# Patient Record
Sex: FEMALE | Race: WHITE
[De-identification: names, ages, dates, MRNs, and addresses within clinical notes are randomized per-mention and may not be internally consistent; named-entity substitution may affect disease eponyms.]

---

## 2018-07-15 ENCOUNTER — HOSPITAL ENCOUNTER (INPATIENT)
Dept: HOSPITAL 80 - FED | Age: 63
LOS: 2 days | Discharge: HOME | DRG: 603 | End: 2018-07-17
Attending: HOSPITALIST | Admitting: HOSPITALIST
Payer: COMMERCIAL

## 2018-07-15 DIAGNOSIS — L03.116: Primary | ICD-10-CM

## 2018-07-15 DIAGNOSIS — Z72.89: ICD-10-CM

## 2018-07-15 DIAGNOSIS — B96.89: ICD-10-CM

## 2018-07-15 DIAGNOSIS — E11.9: ICD-10-CM

## 2018-07-15 DIAGNOSIS — L30.2: ICD-10-CM

## 2018-07-15 LAB
INR PPP: 0.95 (ref 0.83–1.16)
PLATELET # BLD: 178 10^3/UL (ref 150–400)
PROTHROMBIN TIME: 12.9 SEC (ref 12–15)

## 2018-07-15 RX ADMIN — Medication SCH MLS: at 22:51

## 2018-07-15 RX ADMIN — INSULIN LISPRO SCH UNITS: 100 INJECTION, SOLUTION INTRAVENOUS; SUBCUTANEOUS at 17:56

## 2018-07-15 RX ADMIN — FAMOTIDINE SCH MG: 20 TABLET, FILM COATED ORAL at 22:51

## 2018-07-15 RX ADMIN — INSULIN LISPRO SCH UNITS: 100 INJECTION, SOLUTION INTRAVENOUS; SUBCUTANEOUS at 12:57

## 2018-07-15 RX ADMIN — INSULIN LISPRO SCH: 100 INJECTION, SOLUTION INTRAVENOUS; SUBCUTANEOUS at 12:42

## 2018-07-15 RX ADMIN — INSULIN LISPRO SCH UNITS: 100 INJECTION, SOLUTION INTRAVENOUS; SUBCUTANEOUS at 22:51

## 2018-07-15 NOTE — PDGENHP
History and Physical





- Chief Complaint


leg pain, redness, rash





- History of Present Illness


61 yo female with recent diagnosis of diabetes presents to ED with rash.  She 

says she developed a rash on her arms 4 days PTA.  This then spread to her back 

and also her RLE, which was confluent.  In addition, her proximal thighs and 

groin area area are affected.  She initially had significant pruritis.  She 

tried calamine lotion and hydrogen peroxide, which she has been applying to her 

anterior right shin for several days.  She awoke this morning with severe 

itching.  Three weeks ago, she hit her right anterior shin on the corner of a 

dresser, but did not break the skin.  However, after onset of the rash, this 

area opened and began oozing yellow drainage, she denies pus.  She denies fevers

, chills or rigors.


In the ED, blood cultures were drawn and she was given IV benadryl, pepcid, and 

solumedrol for possible allergic component.  She received a dose of vancomycin 

for presumed cellulitis and is admitted to the hospital for further management.





History Information





- Allergies/Home Medication List


Allergies/Adverse Reactions: 








peanut Allergy (Verified 07/15/18 06:51)


 


soy Allergy (Verified 07/15/18 06:51)


 





Home Medications: 








NK [No Known Home Meds]  07/15/18 [Last Taken Unknown]





I have personally reviewed and updated: family history, medical history, social 

history, surgical history





- Past Medical History


diabetes type 2


Additional medical history: Recently diagnosed DM, not on meds





- Surgical History


Reports: no pertinent surgical hx





- Family History


Positive for: non-pertinent





- Social History


Smoking Status: Never smoked


Alcohol Use: Other (daily wine, 1-2 glasses)


Drug Use: None


Additional social history: Works as  for [employer].





Review of Systems


Review of Systems: 





ROS: 10pt was reviewed & negative except for what was stated in HPI & below





Physical Exam


Physical Exam: 

















Temp Pulse Resp BP Pulse Ox


 


 36.8 C   84   20   144/69 H  94 


 


 07/15/18 11:14  07/15/18 11:14  07/15/18 11:14  07/15/18 11:14  07/15/18 11:14











Constitutional: no apparent distress


Eyes: PERRL


Ears, Nose, Mouth, Throat: moist mucous membranes


Cardiovascular: regular rate and rhythym


Respiratory: no respiratory distress, clear to auscultation


Gastrointestinal: normoactive bowel sounds, soft, non-tender abdomen


Skin: warm, rash, other (RLE with confluent erythema and edema with satellite 

lesions and b/l inner thighs with more confluent erythema.  Diffuse 

maculopapular rash with excoriations on b/l extremities and trunk)


Musculoskeletal: full muscle strength


Neurologic: AAOx3


Psychiatric: interacting appropriately





Lab Data & Imaging Review





 07/15/18 07:10





 07/15/18 07:10














WBC  7.59 10^3/uL (3.80-9.50)   07/15/18  07:10    


 


RBC  5.60 10^6/uL (4.18-5.33)  H  07/15/18  07:10    


 


Hgb  17.1 g/dL (12.6-16.3)  H  07/15/18  07:10    


 


POC Hgb  18.4 gm/dL (12.6-16.3)  H  07/15/18  07:27    


 


Hct  49.4 % (38.0-47.0)  H  07/15/18  07:10    


 


POC Hct  54 % (38-47)  H  07/15/18  07:27    


 


MCV  88.2 fL (81.5-99.8)   07/15/18  07:10    


 


MCH  30.5 pg (27.9-34.1)   07/15/18  07:10    


 


MCHC  34.6 g/dL (32.4-36.7)   07/15/18  07:10    


 


RDW  12.3 % (11.5-15.2)   07/15/18  07:10    


 


Plt Count  178 10^3/uL (150-400)   07/15/18  07:10    


 


MPV  13.0 fL (8.7-11.7)  H  07/15/18  07:10    


 


Neut % (Auto)  56.1 % (39.3-74.2)   07/15/18  07:10    


 


Lymph % (Auto)  26.9 % (15.0-45.0)   07/15/18  07:10    


 


Mono % (Auto)  6.5 % (4.5-13.0)   07/15/18  07:10    


 


Eos % (Auto)  10.1 % (0.6-7.6)  H  07/15/18  07:10    


 


Baso % (Auto)  0.3 % (0.3-1.7)   07/15/18  07:10    


 


Nucleat RBC Rel Count  0.0 % (0.0-0.2)   07/15/18  07:10    


 


Absolute Neuts (auto)  4.26 10^3/uL (1.70-6.50)   07/15/18  07:10    


 


Absolute Lymphs (auto)  2.04 10^3/uL (1.00-3.00)   07/15/18  07:10    


 


Absolute Monos (auto)  0.49 10^3/uL (0.30-0.80)   07/15/18  07:10    


 


Absolute Eos (auto)  0.77 10^3/uL (0.03-0.40)  H  07/15/18  07:10    


 


Absolute Basos (auto)  0.02 10^3/uL (0.02-0.10)   07/15/18  07:10    


 


Absolute Nucleated RBC  0.00 10^3/uL (0-0.01)   07/15/18  07:10    


 


Immature Gran %  0.1 % (0.0-1.1)   07/15/18  07:10    


 


Immature Gran #  0.01 10^3/uL (0.00-0.10)   07/15/18  07:10    


 


PT  12.9 SEC (12.0-15.0)   07/15/18  07:10    


 


INR  0.95  (0.83-1.16)   07/15/18  07:10    


 


APTT  23.3 SEC (23.0-38.0)   07/15/18  07:10    


 


VBG Lactic Acid  1.7 mmol/L (0.7-2.1)   07/15/18  07:10    


 


POC Sodium  135 mEq/L (135-145)   07/15/18  07:27    


 


Sodium  133 mEq/L (135-145)  L  07/15/18  07:10    


 


POC Potassium  3.5 mEq/L (3.3-5.0)   07/15/18  07:27    


 


Potassium  3.8 mEq/L (3.3-5.0)   07/15/18  07:10    


 


POC Chloride  97 mEq/L ()   07/15/18  07:27    


 


Chloride  99 mEq/L ()   07/15/18  07:10    


 


Carbon Dioxide  22 mEq/l (22-31)   07/15/18  07:10    


 


Anion Gap  12 mEq/L (8-16)   07/15/18  07:10    


 


POC BUN  24 mg/dL (7-23)  H  07/15/18  07:27    


 


BUN  23 mg/dL (7-23)   07/15/18  07:10    


 


Creatinine  0.8 mg/dL (0.6-1.0)   07/15/18  07:10    


 


POC Creatinine  0.8 mg/dL (0.6-1.0)   07/15/18  07:27    


 


Estimated GFR  > 60   07/15/18  07:10    


 


Glucose  340 mg/dL ()  H  07/15/18  07:10    


 


POC Glucose  346 mg/dL ()  H  07/15/18  11:40    


 


Calcium  9.3 mg/dL (8.5-10.4)   07/15/18  07:10    


 


Total Bilirubin  0.6 mg/dL (0.1-1.4)   07/15/18  07:10    


 


C-Reactive Protein  28.8 mg/L (<10.0)  H  07/15/18  07:10    


 


Procalcitonin  0.14 ng/mL (0.02-0.10)  H  07/15/18  07:10    











Assessment & Plan


Assessment: 








LLE cellulitis - occurred after traumatic injury 3 weeks ago.  Wound culture 

from left anterior shin sent, f/u gram stain.  Continue Vancomycin while 

awaiting Cx data, BCx's also pending.  Trend CRP.  ID consult requested.





Diffuse rash - unclear if this is a disseminated reaction related to LE 

cellulitis (?strep) or if she is having an allergic reaction.  She did apply 

calamine and hydrogen peroxide, either of which may have caused an allergy.  

Will give q6h benadryl, bid pepcid and oral prednisone, acknowledging the 

latter may contribute to elevated blood sugars.





DM type 2 - new diagnosis, not on treatment.  Send a1c to assess overall 

control.  Start low dose Lantus plus ACHS sliding scale insulin, consider 

metformin at d/c. 





Alcohol use - reports 1-2 glasses of wine daily, monitor for signs of 

withdrawal.





Full code





DVT PPLX - Lovenox





Dispo - inpt, anticipate >48 hrs hospitalization for ongoing management of 

cellulitis and possible allergic reaction

## 2018-07-15 NOTE — ASMTCMCOM
CM Note

 

CM Note                       

Notes:

Pt lives alone and works as a , she is admitted with LLE cellulitis. No therapies 

are ordered, unclear if she will need IV abx, CM w/f.



DC Plan: TBD

 

Date Signed:  07/15/2018 04:50 PM

Electronically Signed By:Petra Gonzalez RN

## 2018-07-15 NOTE — PDMN
Medical Necessity


Medical necessity: MCG: M70 Cellulitis: 63 y/o presents with LLE cellulitis and 

diffuse rash r/t strep cellulitis vs. allergy.  IV antibx started (Vanco). 

wound Cx and BC pending.  ID consult.  Hypertensive , , hx 

diabetes.  Anticipate >2MN for ongoing management of cellulitis and possibler 

allergic reaction.

## 2018-07-15 NOTE — GCON
[f rep st]



                                                                    CONSULTATION





INPATIENT INFECTIOUS DISEASE CONSULTATION



REFERRING PHYSICIAN:  Kelly J. Cushing, MD





REASON FOR REFERRAL:  Left lower extremity cellulitis and systemic pruritic rash.



HISTORY OF PRESENT ILLNESS:  The patient is a 62-year-old female with essentially negative past medic
al history, who presented to [facility] Emergency Room with inflammation of her left lo
wer extremity as well as a new onset full body rash that developed over her arms, back, and legs.  Th
e patient relates that 3 weeks ago she hit her left shin on the corner of a chest, but it did not pierre
ak the skin, but did cause a swelling in the area.  The patient noticed that this localized area aidan
me more inflamed and began oozing a yellow clear drainage.  Erythema that was confluent developed ronal
und this area on the leg and now at presentation, she has a fully-confluent erythema from the knee di
stal on the left side.  The patient denied any fever, chills, or rigors with this.  Approximately 4 d
ays ago, she began developing a highly-pruritic macular rash over her entire trunk as well as her rig
ht lower extremity and bilateral upper extremities.  She has been using calamine lotion to decrease t
he itching.  The patient was seen in the emergency room.  Blood cultures were drawn and she was start
ed on IV vancomycin.  She was also given IV Benadryl, Pepcid, and Solu-Medrol for potential allergic 
response, although she had not taken any medications prior to this response.  Currently, she is resti
ng in her hospital bed.  Her symptoms of pruritus are improved after the medications.



PAST MEDICAL HISTORY:  Recently-diagnosed glucose intolerance, possible diabetes mellitus.  She is no
t on medications.



PAST SURGICAL HISTORY:  Negative.



ANTIBIOTICS:  Vancomycin.



ALLERGIES:  The patient is allergic to soy and peanuts.



SOCIAL HISTORY:  [place]  She has no tobacco use.  Patient does drink 1-2 
glasses of wine daily.  No drug use.  The patient works as a  for [employer].



FAMILY HISTORY:  Reviewed, but noncontributory.



REVIEW OF SYSTEMS:  Other than that detailed above in History of Present Illness, a comprehensive 10-
system review is negative.



PHYSICAL EXAMINATION:  VITAL SIGNS:  Temperature maximum is 36.8, temperature current is 36.7, heart 
rate is 99, respiratory rate is 12, blood pressure is 138/73.  GENERAL:  The patient is a well-formed
, well-nourished female in no acute distress.  She is not toxic in appearance.  She is alert and orie
nted x3.  She is pleasant in demeanor.  HEENT:  Normocephalic for age.  Atraumatic.  No scleral icter
us.  No oral lesion or drainage from the nares. EYES:  Lids and conjunctivae are within normal limits
.  Pupils are equal and round bilaterally.  NECK:  Supple.  No meningismus.  LUNGS:  Clear to auscult
ation bilaterally with good effort.  HEART:  Regular rate and rhythm.  No significant murmur, rub, or
 gallop.  No significant peripheral edema.  SKIN:  Warm and dry to the touch.  The patient does have 
a confluent erythematous rash from the knee distal to the ankle on the left lower extremity.  Patient
 also has a macular rash with irregular distribution over all 4 limbs as well as her back.  This is a
 pruritic rash. MUSCULOSKELETAL:  No other muscle belly tenderness is noted.  No joint line effusion 
or arthritis seen.  NEURO:  Cranial nerves 2-12 seem to be intact.  Peripheral sensation seems intact
 in extremities.



LABORATORY DATA:  The patient has a CBC dated 07/15/2018, shows white blood cell count of 7.6, hemogl
obin of 17.1, hematocrit of 49.4, platelet count of 178, differentials within normal limits.  Serum c
hemistries on 07/15/2018, show sodium of 135, potassium 3.5, chloride of 97, bicarbonate of 22, BUN o
f 24, creatinine 0.8.  Glucose levels have been measured in the 300s at checks today.  C-reactive pro
tein is elevated at 28.8.  Procalcitonin is 0.14.



MICROBIOLOGIC DATA:  Patient has blood cultures dated 07/15/2018, which are pending.  Leg swab dated 
07/15/2018, is also pending.



ASSESSMENT:  

1.  Left lower extremity cellulitis secondary to a minor trauma which occurred a couple of weeks ago.
  This is currently covered with monotherapy with vancomycin, although the patient has no history of 
any antibiotic use nor resistant bacterial infections nor significant healthcare exposure.  I think w
drew will change this antibiotic from vancomycin to intravenous cefazolin 2 g q.8 hours.

2.  Full body rash that is pruritic, which occurred on the heels of this infectious presentation in h
er left lower extremity.  This is most consistent with an id reaction.  We can use Benadryl and anti 
inflammatories to manage symptoms, but this should clear up as we are treating the primary infection.




PLAN:  

1.  Discontinue vancomycin.

2.  Start Ancef 2 g IV q.8 hours.

3.  Antihistamines and anti-inflammatories p.r.n. secondary to pruritic rash due to id reaction.





Job #:  [id number]

## 2018-07-16 RX ADMIN — FAMOTIDINE SCH MG: 20 TABLET, FILM COATED ORAL at 20:00

## 2018-07-16 RX ADMIN — Medication SCH MLS: at 05:21

## 2018-07-16 RX ADMIN — Medication SCH MLS: at 13:27

## 2018-07-16 RX ADMIN — Medication SCH MLS: at 22:25

## 2018-07-16 RX ADMIN — INSULIN LISPRO SCH UNITS: 100 INJECTION, SOLUTION INTRAVENOUS; SUBCUTANEOUS at 22:25

## 2018-07-16 RX ADMIN — INSULIN LISPRO SCH UNITS: 100 INJECTION, SOLUTION INTRAVENOUS; SUBCUTANEOUS at 13:27

## 2018-07-16 RX ADMIN — INSULIN LISPRO SCH UNITS: 100 INJECTION, SOLUTION INTRAVENOUS; SUBCUTANEOUS at 17:54

## 2018-07-16 RX ADMIN — FAMOTIDINE SCH MG: 20 TABLET, FILM COATED ORAL at 07:48

## 2018-07-16 RX ADMIN — INSULIN LISPRO SCH UNITS: 100 INJECTION, SOLUTION INTRAVENOUS; SUBCUTANEOUS at 07:49

## 2018-07-16 NOTE — PCMIDPN
Assessment/Plan: 


Assessment/Plan:


* Left lower extremity cellulitis:  Clinically improving with cefazolin and 

elevation.  Anticipate may be able to change to oral antibiotic therapy in next 

24-48 hours.


* Diffuse rash:  Agree may represent an id reaction/autoeczemation.  Responding 

to antihistamines and prednisone.  Continue to follow.





07/16/18 20:04





Subjective: 





Patient notes significant decrease in left lower extremity pain and redness.  

Pruritis markedly decreased.


Objective: 


 Vital Signs











Temp Pulse Resp BP Pulse Ox


 


 36.6 C   68   16   137/43 H  91 L


 


 07/16/18 19:46  07/16/18 19:46  07/16/18 19:46  07/16/18 19:46  07/16/18 19:46








 Microbiology











 07/15/18 13:00 Gram Stain - Final





 Leg - Swab 








 











 07/15/18 07/16/18 07/17/18





 05:59 05:59 05:59


 


Intake Total  1650 


 


Balance  1650 








 











C-Reactive Protein  22.6 mg/L (<10.0)  H  07/16/18  04:38    








Cefazolin # 1, antibiotics # 2


Blood cultures x2 no growth


Wound culture mixed cutaneous caitlyn





- Physical Exam


General Appearance: alert, no apparent distress


EENT: No scleral icterus, No conjunctival petechiae


Respiratory: lungs clear, No respiratory distress


Cardiac/Chest: regular rate, rhythm


Extremities: inflammation (Left lower extremity with less intense erythema from 

knee to foot; crusting over anterior shin without purulence; mildly warm; 

nontender)


Abdomen: non-tender, No distended


Skin: rash (Diffuse erythematous plaques over extremities, abdomen, trunk and 

back)





ICD10 Worksheet


Patient Problems: 


 Problems











Problem Status Onset


 


Cellulitis Acute  


 


Newly diagnosed diabetes Acute  


 


Rash Acute

## 2018-07-16 NOTE — HOSPPROG
Hospitalist Progress Note


Assessment/Plan: 





 Patient is a 61 yo female with recent diagnosis of diabetes presents to ED 

with rash.  She says she developed a rash on her arms 4 days PTA.  This then 

spread to her back and also her RLE, which was confluent.  In addition, her 

proximal thighs and groin area area are affected.  She initially had 

significant pruritus. She also had a minor trauma to her left leg a few weeks 

ago.





*LLE cellulitis 


-Ancef


-secondary to a minor trauma 





*Diffuse rash -consistent with "ID reaction"  (she has an acute rash after 

getting and infection) Much better with treatment of antihistamines.  Will give 

q6h benadryl, bid pepcid and oral prednisone, acknowledging the latter may 

contribute to elevated blood sugars.


-appreciate ID 





DM type 2 - new diagnosis, not on treatment


-A1c is 14.6, will increase Lantus and continue sliding scale


-will ask nursing staff to teach her how to check glucoses


-will place on an ADA diet, ask dietary to see





Alcohol use - reports 1-2 glasses of wine daily, monitor for signs of 

withdrawal.





Full code





DVT PPLX - Lovenox





Dispo - pending





Subjective: Ping johnston said her rash is better, has less itching.


Objective: 


 Vital Signs











Temp Pulse Resp BP Pulse Ox


 


 36.4 C   84   18   146/63 H  94 


 


 07/16/18 07:33  07/16/18 07:33  07/16/18 07:33  07/16/18 07:33  07/16/18 07:33








 Microbiology











 07/15/18 13:00 Gram Stain - Final





 Leg - Swab 








 











 07/15/18 07/16/18 07/17/18





 05:59 05:59 05:59


 


Intake Total  1650 


 


Balance  1650 








 











PT  12.9 SEC (12.0-15.0)   07/15/18  07:10    


 


INR  0.95  (0.83-1.16)   07/15/18  07:10    














- Physical Exam


Constitutional: no apparent distress, appears nourished, uncomfortable


Eyes: PERRL


Ears, Nose, Mouth, Throat: hearing normal


Cardiovascular: regular rate and rhythym


Respiratory: no respiratory distress


Gastrointestinal: normoactive bowel sounds


Skin: warm, other (diffuse confluent rash scattered all over, not involving her 

eyes, mouth or vaginal area)


Musculoskeletal: full muscle strength


Neurologic: AAOx3


Psychiatric: interacting appropriately





ICD10 Worksheet


Patient Problems: 


 Problems











Problem Status Onset


 


Cellulitis Acute  


 


Newly diagnosed diabetes Acute  


 


Rash Acute

## 2018-07-17 VITALS — DIASTOLIC BLOOD PRESSURE: 57 MMHG | SYSTOLIC BLOOD PRESSURE: 143 MMHG

## 2018-07-17 RX ADMIN — Medication SCH MLS: at 06:16

## 2018-07-17 RX ADMIN — INSULIN LISPRO SCH UNITS: 100 INJECTION, SOLUTION INTRAVENOUS; SUBCUTANEOUS at 07:49

## 2018-07-17 RX ADMIN — INSULIN LISPRO SCH UNITS: 100 INJECTION, SOLUTION INTRAVENOUS; SUBCUTANEOUS at 12:01

## 2018-07-17 RX ADMIN — FAMOTIDINE SCH MG: 20 TABLET, FILM COATED ORAL at 07:49

## 2018-07-17 RX ADMIN — Medication SCH MLS: at 14:15

## 2018-07-17 NOTE — PCMIDPN
Assessment/Plan: 





LLE cellulitis: this is my first day, but reportedly much improved, micro data 

negative, very small area of residual erythema remains


--ok to dc on keflex 500mg PO TID x 7 more days


--no ID f/u needed





Rash: improved 








Subjective: 





patient states her erythema RLE better as well as rash


no other side effects to antibiotics


Objective: 


 Vital Signs











Temp Pulse Resp BP Pulse Ox


 


 36.6 C   53 L  16   151/54 H  96 


 


 07/17/18 07:44  07/17/18 07:44  07/17/18 07:44  07/17/18 07:44  07/17/18 07:44








 Microbiology











 07/15/18 13:00 Gram Stain - Final





 Leg - Swab Wound Culture - Final








 











 07/16/18 07/17/18 07/18/18





 05:59 05:59 05:59


 


Intake Total 1650  


 


Balance 1650  








 











C-Reactive Protein  22.6 mg/L (<10.0)  H  07/16/18  04:38    














- Physical Exam


General Appearance: alert, no apparent distress


Respiratory: No accessory muscle use


Neck: supple


Cardiac/Chest: regular rate, rhythm


Extremities: erythema (small area L anterior shin)


Skin: rash (faint MP eruption remains)


Neuro/Psych: alert, normal mood/affect, oriented x 3





ICD10 Worksheet


Patient Problems: 


 Problems











Problem Status Onset


 


Cellulitis Acute  


 


Newly diagnosed diabetes Acute  


 


Rash Acute

## 2018-07-17 NOTE — GDS
[f rep st]



                                                             DISCHARGE SUMMARY





DISCHARGE DIAGNOSES:  

1.  Left lower extremity cellulitis.  

2.  Diffuse rash consistent with ID reaction.

3.  Diabetes, likely type 2, new diagnosis with an A1c of 14.6.

4.  Alcohol use.



CONSULTATION:  Dr. Alexis Almeida.



BRIEF HISTORY:  The patient is a 62-year-old woman with recent diagnosis of diabetes who presented to
 the emergency room with rash.  She developed a rash on her arms 4 days prior to her admission.  Then
 it spread to her back and then to her right lower extremity, which was confluent.  It hit involved h
er proximal thighs and groin areas.  She had hurt her left leg and had a injury to her left shin that
 was open.  She was seen and evaluated by the infectious disease team, who noted that her rash was mo
st consistent with an ID reaction.  She improved with treating her primary infection.  She was also t
reated with Benadryl, Pepcid, and prednisone.  Her antibiotics were initially vancomycin, but then th
is was changed to cefazolin.  She is markedly improved.  She will be discharged home on Keflex for 7 
more days.



HOSPITAL COURSE:  

1.  Left lower extremity cellulitis, markedly improved with treatment.

2.  Diffuse red rash.  She was given Benadryl every 6 hours, Pepcid, and prednisone, much improved, b
ut has also increased her blood sugars.

3.  Diabetes, type 2.  An A1c was checked, it was 14.6.  We had the dietitian see her.  She is on an 
ADA diet.  Nursing staff has worked diligently with her in regard to sliding scale and signs and symp
toms of hyperglycemia and hypoglycemia.

4.  Alcohol use.  She reports 1-2 glasses of wine daily.  She had no signs or symptoms of any type of
 withdrawal symptoms.



DISCHARGE CONDITION:  Stable.  Blood pressure is 143/57, heart rate is 68, respiratory rate is 16, O2
 saturation on room air 92%, temperature 37.1 Celsius.



DISCHARGE MEDICATIONS:  Please see the EMR.



DISCHARGE INSTRUCTIONS:  

1.  To set an appointment with an endocrinologist prior to discharge.  We have given her several name
s.

2.  To take the Lantus at night and then to continue the sliding scale.

3.  Continue Pepcid twice daily until rash is almost completely resolved.

4.  Take the prednisone as instructed in the morning with meals.

5.  Use Benadryl if she should start itching.

6.  Continue ADA diet. 





Greater than 30 minutes discharging and coordinating the patient's care.



Copy requested to:

Dr. Komal Leos



Job #:  [id number]

## 2018-07-17 NOTE — ASDISCHSUM
----------------------------------------------

Discharge Information

----------------------------------------------

Plan Status:Home with No Needs                       Medically Cleared to Leave:07/17/2018

Discharge Date:07/17/2018                            CM D/C Disposition:Home, Routine, Self-Care

ADT D/C Disposition:                                 Projected Discharge Date:07/17/2018

Transportation at D/C:Self                           Discharge Delay Reason:

Follow-Up Date:07/17/2018                            Discharge Slot:

Final Diagnosis:

----------------------------------------------

Placement Information

----------------------------------------------

----------------------------------------------

Patient Contact Information

----------------------------------------------

Contact Name:RIMMA                          Relationship:Friend

Address:                                             Home [phone]

                                                     Work Phone:

City:[place]                                         Alternate Phone:

State/[place] Inter[place] Code:[place]                                    Email:

----------------------------------------------

Financial Information

----------------------------------------------

Financial Class:BCOP

Primary Plan Desc:BC OUT OF STATE PPO                Primary Plan Number:[id number]

Secondary Plan Desc:                                 Secondary Plan Number:

 

 

----------------------------------------------

Assessment Information

----------------------------------------------

----------------------------------------------

BC CM Progress Note

----------------------------------------------

CM Note

 

CM Note                       

Notes:

Pt lives alone and works as a , she is admitted with LLE cellulitis. No therapies 

are ordered, unclear if she will need IV abx, CM w/f.



DC Plan: TBD

 

Date Signed:  07/15/2018 04:50 PM

Electronically Signed By:Petra Gonzalez RN

 

 

----------------------------------------------

Case Management Discharge Plan Note

----------------------------------------------

Case Management Discharge

 

Discharge Order Complete?     Answers:  Yes                                   

Patient to Obtain             Answers:  Independently                         

Medications                                                                   

Transportation Arranged       Answers:  Other                         Notes:  Pt's car is here at 

                                                                              hospital

Discharge Comments            

Notes:

Pt is discharging home today with no CM needs. She has a new DM2 dx and was provided with a 

glucometer for RN teaching. Pt drove herself to the ED and feels comfortable driving herself 

home. She has a friend available for support at home if needed. 

 

Date Signed:  07/17/2018 03:21 PM

Electronically Signed By:Sneha Avendano MSW

 

 

----------------------------------------------

Intervention Information

----------------------------------------------

## 2018-07-17 NOTE — HOSPPROG
Hospitalist Progress Note


Assessment/Plan: 





 Patient is a 61 yo female with recent diagnosis of diabetes presents to ED 

with rash.  She says she developed a rash on her arms 4 days PTA.  This then 

spread to her back and also her RLE, which was confluent.  In addition, her 

proximal thighs and groin area area are affected.  She initially had 

significant pruritus. She also had a minor trauma to her left leg a few weeks 

ago.





*LLE cellulitis 


-Ancef


-secondary to a minor trauma 


-looks much improved





*Diffuse rash -consistent with "ID reaction"  (she has an acute rash after 

getting and infection) Much better with treatment of antihistamines.  Will give 

q6h Benadryl, bid pepcid and oral prednisone, acknowledging the latter may 

contribute to elevated blood sugars.


-appreciate ID 





DM type 2 - new diagnosis, not on treatment


-A1c is 14.6,  increased Lantus and continue sliding scale


-nursing staff is teaching her how to check glucoses and give herself insulin


-ADA diet, appreciate dietary seeing her


-she is calling her PCP this morning for close f/u this week





Alcohol use - reports 1-2 glasses of wine daily, monitor for signs of 

withdrawal.





Full code





DVT PPLX - Lovenox





Dispo - pending





Subjective: Ping Tiwari is feeling much better today.


Objective: 


 Vital Signs











Temp Pulse Resp BP Pulse Ox


 


 36.6 C   53 L  16   151/54 H  96 


 


 07/17/18 07:44  07/17/18 07:44  07/17/18 07:44  07/17/18 07:44  07/17/18 07:44








 Microbiology











 07/15/18 13:00 Gram Stain - Final





 Leg - Swab 








 











 07/16/18 07/17/18 07/18/18





 05:59 05:59 05:59


 


Intake Total 1650  


 


Balance 1650  








 











PT  12.9 SEC (12.0-15.0)   07/15/18  07:10    


 


INR  0.95  (0.83-1.16)   07/15/18  07:10    














- Physical Exam


Constitutional: no apparent distress, appears nourished, not in pain


Eyes: PERRL


Ears, Nose, Mouth, Throat: hearing normal


Respiratory: no respiratory distress


Skin: warm, other (rash is widespread, but less red, decreasing in size, 

patient is having less itching; Left lower ext cellulitis much improved, less 

red, less swelling)


Musculoskeletal: full muscle strength


Neurologic: AAOx3


Psychiatric: interacting appropriately





ICD10 Worksheet


Patient Problems: 


 Problems











Problem Status Onset


 


Cellulitis Acute  


 


Newly diagnosed diabetes Acute  


 


Rash Acute

## 2018-07-17 NOTE — ASMTDCNOTE
Case Management Discharge

 

Discharge Order Complete?     Answers:  Yes                                   

Patient to Obtain             Answers:  Independently                         

Medications                                                                   

Transportation Arranged       Answers:  Other                         Notes:  Pt's car is here at 

                                                                              hospital

Discharge Comments            

Notes:

Pt is discharging home today with no CM needs. She has a new DM2 dx and was provided with a 

glucometer for RN teaching. Pt drove herself to the ED and feels comfortable driving herself 

home. She has a friend available for support at home if needed. 

 

Date Signed:  07/17/2018 03:21 PM

Electronically Signed By:MINERVA Bronson

## 2018-07-17 NOTE — ASMTLACE
LACE

 

Length of stay for            Answers:  2 days                                

current admission                                                             

Acuity / Level of             Answers:  Yes                                   

Care: Did the patient                                                         

have an inpatient                                                             

admission?                                                                    

Comorbidities - select        Answers:  Diabetes (uncontrolled or             

all that apply                          controlled)                           

# of Emergency department     Answers:  1-2                                   

visits in the last 6                                                          

months                                                                        

Score: 7

 

Date Signed:  07/17/2018 03:22 PM

Electronically Signed By:MINERVA Bronson

## 2018-10-30 ENCOUNTER — HOSPITAL ENCOUNTER (OUTPATIENT)
Dept: HOSPITAL 80 - FSGY | Age: 63
Discharge: HOME | End: 2018-10-30
Attending: OBSTETRICS & GYNECOLOGY
Payer: COMMERCIAL

## 2018-10-30 VITALS — DIASTOLIC BLOOD PRESSURE: 57 MMHG | SYSTOLIC BLOOD PRESSURE: 131 MMHG

## 2018-10-30 DIAGNOSIS — N95.0: ICD-10-CM

## 2018-10-30 DIAGNOSIS — D25.9: Primary | ICD-10-CM

## 2018-10-30 PROCEDURE — 0UDB8ZX EXTRACTION OF ENDOMETRIUM, VIA NATURAL OR ARTIFICIAL OPENING ENDOSCOPIC, DIAGNOSTIC: ICD-10-PCS | Performed by: OBSTETRICS & GYNECOLOGY

## 2018-10-30 PROCEDURE — 58558 HYSTEROSCOPY BIOPSY: CPT

## 2018-10-30 PROCEDURE — C1782 MORCELLATOR: HCPCS

## 2018-10-30 NOTE — POSTOPPROG
Post Op Note


Date of Operation: 10/30/18


Surgeon: Irena Avery


Anesthesiologist: Rich Robles MD


Anesthesia: LMA


Pre-op Diagnosis: PMB thick endometrium 


Post-op Diagnosis: fibroid


Indication: same


Procedure: H/S myomectomy


Findings:  multiple uterine fibroids


Inf/Abcess present in the surg proc area at time of surgery?: No


Depth: Superfical  (Skin SQ)


EBL: Minimal

## 2018-10-30 NOTE — PDANEPAE
ANE History of Present Illness





here for hysteroscopy





ANE Past Medical History





- Cardiovascular History


Hx Hypertension: No


Hx Arrhythmias: No


Hx Chest Pain: No


Hx Coronary Artery / Peripheral Vascular Disease: No


Hx CHF / Valvular Disease: No


Hx Palpitations: No





- Pulmonary History


Hx COPD: No


Hx Asthma/Reactive Airway Disease: No


Hx Recent Upper Respiratory Infection: No


Hx Oxygen in Use at Home: No


Hx Sleep Apnea: No


Sleep Apnea Screening Result - Last Documented: Negative





- Neurologic History


Hx Cerebrovascular Accident: No


Hx Seizures: No


Hx Dementia: No





- Endocrine History


Hx Diabetes: Yes


Endocrine History Comment: NIDDM MANAGES WITH DIET.  FLARED DURING CELLULITS 7/ 2018.  HGB A1C LEVELS ARE COMING DOWN.  ENDOCRINOLOGIST DR KATHY WASHINGTON





- Renal History


Hx Renal Disorders: No





- Liver History


Hx Hepatic Disorders: No





- Neurological & Psychiatric Hx


Hx Neurological and Psychiatric Disorders: No





- Congenital Disorder History


Hx Congenital Disorders: No





- GI History


Hx Gastrointestinal Disorders: No





- Other Health History


Other Health History: 2 TEETH MISSING WAITING FOR DENTAL IMPLANTS.  CELLULITIS 

LT LOWER LEG 7/2018 NOW RESOLVED





- Chronic Pain History


Chronic Pain: No





- Surgical History


Prior Surgeries: NELL CATARACT





ANE Review of Systems


Review of systems is: negative


Review of Systems: 








- Exercise capacity


Exercise capacity: >=4 METS


METS (RN): 5 METS





ANE Patient History





- Allergies


Allergies/Adverse Reactions: 








peanut Allergy (Verified 10/30/18 06:44)


 SEVERE NAUSEA


soy Allergy (Verified 10/30/18 06:44)


 SKIN RASHES








- Home Medications


Home medications: home medication list seen and reviewed


Home Medications: 








Insulin Lispro [HumaLOG LISPRO] 0 unit SC PRN 09/28/18 [Last Taken Unknown]








- NPO status


NPO Status: no food or drink >8 hours


NPO Since - Liquids (Date): 10/30/18


NPO Since - Liquids (Time): 03:30


NPO Since - Solids (Date): 10/29/18


NPO Since - Solids (Time): 23:00





- Anes Hx


Anes Hx: no prior problems





- Smoking Hx


Smoking Status: Never smoked





ANE Labs/Vital Signs





- Vital Signs


Vital Signs: reviewed preoperatively; see RN documention for details


Blood Pressure: 168/65


Heart Rate: 66


Respiratory Rate: 18


O2 Sat (%): 97


Height: 163.83 cm


Weight: 71.668 kg





ANE Physical Exam





- Airway


Neck exam: FROM


Mallampati Score: Class 1





- Pulmonary


Pulmonary: no respiratory distress





- Cardiovascular


Cardiovascular: regular rate and rhythym





- ASA Status


ASA Status: II





ANE Anesthesia Plan


Anesthesia Plan: GA with mask

## 2018-10-30 NOTE — GOP
DATE OF OPERATION:  10/30/2018



SURGEON:  Irena Avery MD



ANESTHESIA:  General with LMA.



ANESTHESIOLOGIST:  Rich Robles MD



PREOPERATIVE DIAGNOSIS:  Prolonged postmenopausal bleeding with an endometrial mass noted on ultrasou
nd and uterus that has multiple intramural fibroids.



POSTOPERATIVE DIAGNOSIS:  Submucosal fibroid.



PROCEDURE PERFORMED:  Hysteroscopic myomectomy.



FINDINGS:  

1.  She had a very narrow vaginal opening.

2.  She had a very stenotic cervix that was difficult to dilate with a very anteverted uterus.

3.  There were multiple submucosal fibroids pushing into the endometrium, and otherwise enlarged uter
us.





ESTIMATED BLOOD LOSS:  Minimal.



INDICATIONS:  Patient is a 62-year-old who states she has never really stopped having vaginal bleedin
g and never went through a phase of menopause, and has continued to have irregular bleeding at this p
oint.  An endometrial biopsy done in June 2018, showed retrogressive cystic change and no abnormality
.  Because the patient has had continued bleeding, ultrasound was done and this showed a 3.8 cm endom
etrium.  Recommend evaluation and resection of this endometrial mass.



DESCRIPTION OF PROCEDURE:  With informed consent signed, patient was taken to the operating room, [facility] under general anesthesia, placed in the low dorsal lithotomy position, prepped and draped in the 
usual sterile fashion.  Bladder previously emptied.  



Initially all the speculums in the operating room were too wide to be inserted into the vaginal canal
 due to the very narrow introitus.  Eventually, I was able to get a long Radha speculum and that w
as placed.  Once visualization of the cervix was done, tenaculum placed on the anterior lip of the ce
rvix.  The cervix was very stenotic.  It took some time with dilation.  It probably took about 10 min
utes to dilate the cervix as 1) it was very stenotic and 2) the uterus was extremely anteverted. 



Once I was able to dilate up to 6 mm, Hysteroscope placed using normal saline as a filling medium, an
d several endometrial masses noted.  [employer] Tru[employer] rotary blade was placed into the uteri
ne cavity and resection of the masses done without complication.  



Once it was felt everything had been removed, the hysteroscope removed, and the cervix has some mild 
bleeding, but was mostly felt to be hemostatic.  Pictures were not able to be taken due to just exces
sive bleeding of the endometrium.  Once it was felt that everything was removed, the hysteroscope rem
marija and patient placed in supine position.  She was awakened in the operating room, taken to the rec
overy room in stable condition, tolerated procedure well.  Net fluid deficit was 100 cc.



COMPLICATIONS:  None.



COMPLICATIONS:  None.



Copy requested to:

Komal Leos



Job #:  [id number]